# Patient Record
Sex: FEMALE | Race: WHITE | HISPANIC OR LATINO | Employment: UNEMPLOYED | URBAN - METROPOLITAN AREA
[De-identification: names, ages, dates, MRNs, and addresses within clinical notes are randomized per-mention and may not be internally consistent; named-entity substitution may affect disease eponyms.]

---

## 2017-02-17 ENCOUNTER — GENERIC CONVERSION - ENCOUNTER (OUTPATIENT)
Dept: OTHER | Facility: OTHER | Age: 37
End: 2017-02-17

## 2017-06-08 ENCOUNTER — GENERIC CONVERSION - ENCOUNTER (OUTPATIENT)
Dept: OTHER | Facility: OTHER | Age: 37
End: 2017-06-08

## 2017-10-06 ENCOUNTER — GENERIC CONVERSION - ENCOUNTER (OUTPATIENT)
Dept: OTHER | Facility: OTHER | Age: 37
End: 2017-10-06

## 2017-12-04 ENCOUNTER — GENERIC CONVERSION - ENCOUNTER (OUTPATIENT)
Dept: INTERNAL MEDICINE CLINIC | Facility: CLINIC | Age: 37
End: 2017-12-04

## 2018-01-10 NOTE — MISCELLANEOUS
Provider Comments  Provider Comments:   Pt was n/s  Try to call phone on file but voice mailbox is not set up  Signatures   Electronically signed by : JOSSELYN Jackson;  Dec  5 2016  5:56PM EST                       (Author)

## 2018-01-10 NOTE — PSYCH
Assessment    1  Major depression, recurrent (296 30) (F33 9)   2  Generalized anxiety disorder (300 02) (F41 1)   3  Family history of Depression, major, single episode, moderate : Mother    Plan    1  Amitriptyline HCl - 25 MG Oral Tablet; TAKE 1 TABLET AT BEDTIME    Chief Complaint  Referred by PCP and Rheumatologist      History of Present Illness  31  female with past history of depression  She stated she had been treated a long time ago with SSRI's and had stopped treatment because every trial came with side effects that she was not willing to endure  She stated last time she took antidepressants was over 6 years ago  No hx of Inpatient psychiatric treatment  She stated that she is having difficulties coping with chronic pain caused by ankylosing spondylitis that was dx 3 years ago  She stated she started tx for chronic pain about 1 and 1/2 years ago  She stated she has difficulties sleeping, and feels depressed due to her multiple physical conditions  3 years ago she was working as an  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[place] [address] and she cared for her 3 children  She was working about 60 hrs per week and was also doing her masters in psychology  She stated back then she lived in [place] [address]  She started having symptoms prior to that but she had ignored it for a while until one day she could not get out of car after a 2 hour drive  She stated that she was evaluated by neurosurgeon and friend of her family and was found to have ankylosing spondylitis  She stated she had multiple tx approaches including nerve pain  the last choice she was offered was oral methotrexate but she was too fearful of the side effects  She stated that her  had worked in [place] at a company out in [employer] and they made decision to move to [place] March 2015  Since she has been in [place] she has been in Humira at that has only help with her ulcerative colitis symptoms   She has been taking pain killers for the past 3 years and she stated that initially she was able to obtain relieve with just 37 5 mg of Tramadol and at this point she takes 100 mg tid  She stated her rheumatologist ran out of options and is suggesting pain management with the possibility of using fentanyl patch for tx  She has tried Elavil 5 mg and prior to that she had a trial of Cymbalta 30 mg but experienced confusion She stated that she also tried Lyrica 50 mg and she experienced increased anxiety  She is not sure if this confusion was related to a drug drug interaction with Tramadol  She stated she currently feels, irritable, fatigued, has poor sleep, feels frustrated, feels she is worse physically than she was 6 months ago  She stated she has never used prescribed sleep medications  She has 15 yo daughter, 10 yo son and 3 yo son  She stated her oldest daughter helps with the children  She stated that all her family is in [address] [facility] and she has no support here in [place]  She stated she has poor tolerance for stress, she avoids crowds  Review of Systems  anxiety, depression, emotional lability, hostility, interpersonal relationship problems, emotional problems/concerns, sleep disturbances and decreased functioning ability  Constitutional: feeling poorly and feeling tired  ENT: no ear ache, no loss of hearing, no nosebleeds or nasal discharge, no sore throat or hoarseness  Cardiovascular: no complaints of slow or fast heart rate, no chest pain, no palpitations, no leg claudication or lower extremity edema  Respiratory: no complaints of shortness of breath, no wheezing, no dyspnea on exertion, no orthopnea or PND  Gastrointestinal: nausea and diarrhea  Genitourinary: no complaints of dysuria, no incontinence, no pelvic pain, no dysmenorrhea, no vaginal discharge or abnormal vaginal bleeding  Musculoskeletal: arthralgias, joint swelling and joint stiffness     Integumentary: no complaints of skin rash or lesion, no itching or dry skin, no skin wounds  Neurological: confusion  ROS reviewed  Past Psychiatric History    Past Psychiatric History: Treated for depression many years ago  No history of psychiatric hospitalizations     Substance Abuse Hx    Substance Abuse History: denies  Active Problems    1  Abnormal uterine bleeding (626 9) (N93 9)   2  Ankylosing spondylitis (720 0) (M45 9)   3  Asthma, mild intermittent, well-controlled (493 90) (J45 20)   4  Chronic low back pain (724 2,338 29) (M54 5,G89 29)   5  Depression (311) (F32 9)   6  External hemorrhoid (455 3) (K64 4)   7  Female pelvic pain (625 9) (R10 2)   8  Generalized anxiety disorder (300 02) (F41 1)   9  History of colon polyps (V12 72) (Z86 010)   10  Hypoglycemia (251 2) (E16 2)   11  Indigestion (536 8) (K30)   12  Insomnia (780 52) (G47 00)   13  Irritable bowel syndrome (564 1) (K58 9)   14  Major depression, recurrent (296 30) (F33 9)   15  Pelvic congestion syndrome (625 5) (N94 89)   16  Screening for cervical cancer (V76 2) (Z12 4)   17  Vitamin D deficiency (268 9) (E55 9)    Past Medical History    1  History of Abdominal pain, RUQ (789 01) (R10 11)   2  History of Acute abdominal pain (789 00,338 19) (R10 9)   3  History of Burning with urination (788 1) (R30 0)   4  History of Hepatomegaly (789 1) (R16 0)   5  History of heartburn (V12 79) (Z87 898)   6  History of urinary frequency (V13 09) (Z87 898)   7  History of urinary tract infection (V13 02) (Z87 440)   8  History of Irritable bowel syndrome with diarrhea (564 1) (K58 0)   9  History of Need for influenza vaccination (V04 81) (Z23)   10  History of Right wrist pain (719 43) (M25 531)   11  Urinary tract infection (599 0) (N39 0)   12  History of Urine troubles (V47 4) (R39 89)   13  History of Wrist injury (959 3) (S69 90XA)    The active problems and past medical history were reviewed and updated today  Surgical History    The surgical history was reviewed and updated today  Allergies    1  Aspirin EC Extra Strength TBEC   2  Penicillins    Current Meds   1  Advair Diskus 250-50 MCG/DOSE Inhalation Aerosol Powder Breath Activated; INHALE 1   PUFF TWICE DAILY  RINSE MOUTH AFTER USE; Therapy: 03EPL9456 to (Kimberly Fenton)  Requested for: 08VNV9766; Last   Rx:31Oct2016 Ordered   2  Biotin Maximum Strength 63446 MCG Oral Tablet; TAKE 1 TABLET DAILY; Therapy: (Recorded:01Nov2016) to Recorded   3  Dicyclomine HCl - 10 MG Oral Capsule; TAKE 1 CAPSULE Every 8 hours PRN   CRAMPING ABDOMEN;   Therapy: 22Mar2016 to (Evaluate:23Wjl3723)  Requested for: 22Mar2016; Last   Rx:22Mar2016 Ordered   4  Dicyclomine HCl - 20 MG Oral Tablet; TAKE 1 TABLET 3 times daily PRN; Therapy: 14Oao1964 to (Evaluate:74Nto2384)  Requested for: 20Jul2016; Last   Rx:02Qti3233 Ordered   5  Flexeril 5 MG TABS; TAKE 2 TABLETS AT BEDTIME for muscle spasm; Therapy: (Recorded:01Nov2016) to Recorded   6  FreeStyle Lite Test In [employer]; Check 3 times per day; Therapy: 91DHB4334 to (Evaluate:30Jan2017); Last Rx:01Nov2016 Ordered   7  Humira 40 MG/0 8ML Subcutaneous Prefilled Syringe Kit; 1 every 7 days; Therapy: (Recorded:01Nov2016) to Recorded   8  Norethindrone 0 35 MG Oral Tablet; TAKE 1 TABLET DAILY; Therapy: 66Ygi8920 to (Evaluate:89Rwu8243)  Requested for: 70Mpe8358; Last   Rx:53Vzd9497 Ordered   9  Oxycodone-Acetaminophen 5-325 MG Oral Tablet; TAKE 1 TABLET Every twelve hours   PRN pain; Therapy: 48OUM6770 to (Evaluate:38Wxj5712); Last Rx:42Oqt0934 Ordered   10  Pantoprazole Sodium 40 MG Oral Tablet Delayed Release; TAKE 1 TABLET ONCE DAILY; Therapy: 73IBZ6187 to (Manuel Monzon)  Requested for: 31Oct2016; Last    Rx:31Oct2016 Ordered   11  ProAir  (90 Base) MCG/ACT Inhalation Aerosol Solution; INHALE 2 PUFFS    EVERY 4-6 HOURS AS NEEDED; Therapy: 10SYK4241 to (Evaluate:13Tay7557)  Requested for: 31Oct2016; Last    Rx:31Oct2016 Ordered   12   TraMADol HCl - 50 MG Oral Tablet; Therapy: (Recorded:02Tss1427) to Recorded   13  Vitamin D CAPS; take 1 tablet by mouth once daily; Therapy: (Recorded:24Mkx7545) to Recorded    The medication list was reviewed and updated today  Family Psych History  Mother    1  Family history of Ankylosing spondylitis of cervicothoracic region   2  Denied: Family history of Crohn's disease without complication, unspecified   gastrointestinal tract location   3  Family history of Depression, major, single episode, moderate   4  Family history of arthritis (V17 7) (Z82 61)   5  Family history of cardiac disorder (V17 49) (Z82 49)   6  Family history of essential hypertension (V17 49) (Z82 49)   7  Family history of hypertension (V17 49) (Z82 49)   8  Denied: Family history of liver disease   9  Denied: Family history of Malignant neoplasm of colon, unspecified part of colon  Father    8  Denied: Family history of Crohn's disease without complication, unspecified    gastrointestinal tract location   6  Family history of hypertension (V17 49) (Z82 49)   12  Denied: Family history of liver disease   15  Denied: Family history of Malignant neoplasm of colon, unspecified part of colon  Grandparent    14  Family history of cardiac disorder (V17 49) (Z82 49)  Grandmother    15  Family history of diabetes mellitus (V18 0) (Z83 3)  Grandfather    12  Family history of liver disease (V18 59) (Z83 79)  Maternal Grandfather    16  Family history of Acute leukemia   18  Family history of Throat cancer  Maternal Aunt    19  Family history of Acute leukemia   20  Family history of Brain tumor   21  Family history of Colon cancer   25  Family history of Malignant neoplasm of hepatic flexure   23  Family history of Malignant neoplasm of sigmoid colon   24  Family history of Throat cancer  Uncle    22  Family history of Malignant neoplasm of colon, unspecified part of colon  Maternal Uncle    26  Family history of Acute leukemia   27  Family history of Brain tumor   28  Family history of Colon cancer   34  Family history of Drug addiction   30  Family history of Malignant neoplasm of hepatic flexure   31  Family history of Throat cancer  Cousin    28  Family history of Acute leukemia  Family History    35  Family history of malignant neoplasm (V16 9) (Z80 9)  Mother had several suicides attempts by OD on alprazolam and alcohol  The family history was reviewed and updated today  Social History    · Caffeine use (V49 89) (F15 90)   · Cigarette smoker (305 1) (F17 210)   · Current every day smoker   · Former consumption of alcohol (V11 3) (Z87 898)   · Housewife or homemaker   · No alcohol use   · No drug use  The social history was reviewed and updated today  Vitals  Signs   Recorded: 22IMZ5607 02:53PM   Height: 4 ft 11 in  Weight: 104 lb   BMI Calculated: 21 01  BSA Calculated: 1 4    Physical Exam    Appearance: was calm and cooperative, adequate hygiene and grooming and good eye contact  Observed mood: depressed and anxious  Observed mood: affect was constricted and affect was tearful  Speech: a normal rate and fluent  Thought processes: coherent/organized  Hallucinations: no hallucinations present  Thought Content: no delusions  Abnormal Thoughts: The patient has no suicidal thoughts and no homicidal thoughts  Orientation: The patient is oriented to person, place and time, oriented to person, oriented to place and oriented to time  Recent and Remote Memory: short term memory intact and long term memory intact  Attention Span And Concentration: concentration intact  Insight: Limited insight  Judgment: Her judgment was limited  The patient is experiencing moderate to severe pain  On a scale of 0 - 10 the pain severity is an 8  Initial Evaluation provided today  Risks, Benefits And Possible Side Effects Of Medications: Risks, benefits, and possible side effects of medications explained to patient and patient verbalizes understanding  End of Encounter Meds    1  Dicyclomine HCl - 20 MG Oral Tablet; TAKE 1 TABLET 3 times daily PRN; Therapy: 42Cnv5276 to (Evaluate:10Opl2218)  Requested for: 02Hzl2587; Last   Rx:01Okt1467 Ordered    2  Norethindrone 0 35 MG Oral Tablet; TAKE 1 TABLET DAILY; Therapy: 98Lkz3152 to (Evaluate:76Jsv8134)  Requested for: 29Gvf0428; Last   Rx:49Ror1440 Ordered    3  Oxycodone-Acetaminophen 5-325 MG Oral Tablet; TAKE 1 TABLET Every twelve hours   PRN pain; Therapy: 61FDQ5230 to (Evaluate:43Byx5548); Last Rx:12Bqm9140 Ordered    4  Advair Diskus 250-50 MCG/DOSE Inhalation Aerosol Powder Breath Activated; INHALE 1   PUFF TWICE DAILY  RINSE MOUTH AFTER USE; Therapy: 37LFM5337 to (Jordana Mays)  Requested for: 31Oct2016; Last   Rx:60Nod5376 Ordered   5  ProAir  (90 Base) MCG/ACT Inhalation Aerosol Solution; INHALE 2 PUFFS   EVERY 4-6 HOURS AS NEEDED; Therapy: 25UWU8529 to (Evaluate:09Fxo1835)  Requested for: 31Oct2016; Last   Rx:31Oct2016 Ordered    6  Amitriptyline HCl - 25 MG Oral Tablet; TAKE 1 TABLET AT BEDTIME; Therapy: 48XJN1532 to (Evaluate:09Jan2017); Last Rx:10Nov2016 Ordered    7  FreeStyle Lite Test In [employer]; Check 3 times per day; Therapy: 02ZBE1626 to (Evaluate:30Jan2017); Last Rx:01Nov2016 Ordered    8  Pantoprazole Sodium 40 MG Oral Tablet Delayed Release; TAKE 1 TABLET ONCE DAILY; Therapy: 96DGF7353 to (Evaluate:29Apr2017)  Requested for: 31Oct2016; Last   Rx:31Oct2016 Ordered    9  Dicyclomine HCl - 10 MG Oral Capsule; TAKE 1 CAPSULE Every 8 hours PRN   CRAMPING ABDOMEN;   Therapy: 22Mar2016 to (Evaluate:06Lhs8897)  Requested for: 22Mar2016; Last   Rx:22Mar2016 Ordered    10  Biotin Maximum Strength 75002 MCG Oral Tablet; TAKE 1 TABLET DAILY; Therapy: (Recorded:01Nov2016) to Recorded   11  Flexeril 5 MG TABS (Cyclobenzaprine HCl); TAKE 2 TABLETS AT BEDTIME for muscle    spasm; Therapy: (Recorded:01Nov2016) to Recorded   12   Humira 40 MG/0 8ML Subcutaneous Prefilled Syringe Kit; 1 every 7 days; Therapy: (Recorded:01Nov2016) to Recorded   13  TraMADol HCl - 50 MG Oral Tablet; Therapy: (Recorded:14Apr2015) to Recorded   14  Vitamin D CAPS; take 1 tablet by mouth once daily; Therapy: (Recorded:01Nov2016) to Recorded    Future Appointments    Date/Time Provider Specialty Site   12/14/2016 11:30 AM PATRICIA Goff  Obstetrics/Gynecology [facility]   12/20/2016 02:50 PM PATRICIA Reardon   Endocrinology [facility]   12/05/2016 10:00 AM Brittany Downs  Internal [facility]   12/22/2016 01:30 PM Brittany Downs  Internal [facility]   12/08/2016 09:00 AM Samia Leyva [facility] [address]     Signatures   Electronically signed by : PATRICIA Jameson ; Nov 10 2016  3:04PM EST                       (Author)

## 2018-01-10 NOTE — RESULT NOTES
Message   cortisol levels remain low  unclear why? Pls arrange for 1 mcg low dose cosyntropin stim test at [facility]  Also get MRI of pituitary gland  call for new symptoms or problems     Verified Results  (1) DHEA-S 21Nov2016 07:58AM Selinda Peppers   TW Order Number: [id number]     Test Name Result Flag Reference   DHEA-SULFATE 48 2 ug/dL L 84 8 - 378 0   Performed at:  [facility] [address]  [id number]  : Lucio Wagoner MD, Phone:  [phone]     (1) ADRENOCORTICOTROPHIN 04YEL1147 07:58AM [employer] Order Number: [id number]     Test Name Result Flag Reference   ADRENOCORT  TROP 9 8 pg/mL  7 2 - 63 3   ACTH reference interval for samples collected between 7 and 10 AM     Performed at:  [facility] [address]  [id number]  : Lucio Wagoner MD, Phone:  [phone]     (1) CORTISOL AM SPECIMEN 92HMN9136 07:58AM Selinda Peppers   TW Order Number: [id number]     Test Name Result Flag Reference   CORTISO AM SPEC 5 5 ug/mL  4 2-22 4   Reference ranges established for specimens drawn between 7 and 9 am  Results may be inaccurate if timing is not correct  (1) INSULIN ANTIBODIES 09KHF8534 10:52AM [employer] Order Number: [id number]     Test Name Result Flag Reference   INSULIN ANTIBODIES <5 0 uU/mL     This test is also known as insulin autoantibody or IAA    Reference Range:  <5 0        Negative  > or = 5 0  Positive    Performed at:  [address] [facility] Endocrinology  [address] [place], [place]  [de-identified]  : Aspen Beck MD, Phone:  [phone]     (1) PROINSULIN 64UQP5935 10:52AM Selinda Peppers   TW Order Number: [id number]     Test Name Result Flag Reference   PROINS 1 9 pmol/L  0 0 - 10 0   Performed at:  [address]  [id number]  : Neo Brown MD, Phone:  [phone] Plan  Low serum cortisol level    · * MRI BRAIN PITUITARY WO AND W CONTRAST; Status:Need Information - Financial  Authorization;  Requested for:23Nov2016;

## 2018-01-11 NOTE — RESULT NOTES
Verified Results  * US PELVIS COMPLETE [facility] [place] AND TRANSVAGINAL) 80FJM5414 10:54AM Leetta Alpers Order Number: [id number]     Test Name Result Flag Reference   US PELVIS COMPLETE (TRANSABDOMINAL AND TRANSVAGINAL) (Report)     PELVIC ULTRASOUND, COMPLETE     INDICATION: Pelvic pain, irregular cycles and frequent urination  LMP was 2016, still spotting  COMPARISON: CT abdomen and pelvis 2016     TECHNIQUE:  Transabdominal pelvic ultrasound was performed in sagittal and transverse planes with a curvilinear transducer  Additional transvaginal imaging was performed to better evaluate the endometrium and ovaries  Imaging included volumetric    sweeps as well as traditional still imaging technique  FINDINGS:     UTERUS:   The uterus is anteverted in position, measuring 10 0 x 4 7 x 5 6 cm  Contour and echotexture appear normal   scar  The cervix shows no suspicious abnormality  ENDOMETRIUM:    Normal caliber of 4 mm  Homogenous and normal in appearance  OVARIES/ADNEXA:   Right ovary: 2 7 x 1 8 x 1 6 cm  No suspicious right ovarian abnormality  1 6 cm exophytic follicle versus paraovarian cyst    Doppler flow within normal limits  Left ovary: 3 6 x 2 6 x 3 0 cm  No suspicious left ovarian abnormality  2 8 x 2 0 x 2 5 cm simple cyst    Doppler flow within normal limits  No suspicious adnexal mass or loculated collections  There is no free fluid  Incidental note is made of negligible post void urinary bladder residual        IMPRESSION:      Essentially unremarkable pelvic ultrasound  Simple appearing ovarian follicles and/or cysts  Workstation performed: [id number]     Signed by:   Pili Dawkins DO   16       Discussion/Summary   SIMPLE CYST AND FOLLICLES    F/U WITH GYN FOR DISCOMFORT

## 2018-01-11 NOTE — RESULT NOTES
Message   Cortisol levels are better  She seems to have good response to the test  ? she is scheduled with MRI of the pituitary? Verified Results  (1) CORTISOL RANDOM 59EOH4630 01:08PM Christian[facility] Minion     Test Name Result Flag Reference   CORTISOL, RANDOM 13 3 ug/mL       (1) DHEA-S 56DYE4002 01:08PM [place] [facility]     Test Name Result Flag Reference   DHEA-SULFATE 91 9 ug/dL  84 8 - 378 0   Performed at:  [address] [place], [place] [address] [address]  [id number]  : Digna Lopez MD, Phone:  [phone]     (1) CORTISOL RANDOM 52ECQ6780 12:38PM Christiano Minion     Test Name Result Flag Reference   CORTISOL, RANDOM 15 9 ug/mL       (1) CORTISOL RANDOM 18VYJ8838 11:29AM [place] [facility]     Test Name Result Flag Reference   CORTISOL, RANDOM 8 8 ug/mL       (1) DHEA-S 22RMU4875 11:29AM [place] [facility]     Test Name Result Flag Reference   DHEA-SULFATE 89 7 ug/dL  84 8 - 378 0   Performed at:  [employer] [address]  [id number]  : Digna Lopez MD, Phone:  [phone]     [phone] 50RPM6981 11:29AM [place] [facility]ion     Test Name Result Flag Reference   ADRENOCORT  TROP 5 9 pg/mL L 7 2 - 63 3   ACTH reference interval for samples collected between 7 and 10 AM   Performed at:  [employer] [address]  [id number]  : Digna Lopez MD, Phone:  [phone]

## 2018-01-11 NOTE — RESULT NOTES
Message   Insulin parameters for the current glucose of 74 are acceptable  Start vitamin D 5000 units once a day  Cortisol could be temporary low because she recently started Advair inhaler  Ask her if she had any other steroid intake also? repeat cortisol, DHEAS and ACTH about 1- 2 weeks after stopping the last steroid use  Verified Results  (1) VITAMIN D 25-HYDROXY [id number] 10:52AM Kermit Matthew Order Number: [id number]     Test Name Result Flag Reference   VIT D 25-HYDROX 17 2 ng/mL L 30 0-100 0   This assay is a certified procedure of the CDC Vitamin D Standardization Certification Program (VDSCP)     Deficiency <20ng/ml   Insufficiency 20-30ng/ml   Sufficient  ng/ml     *Patients undergoing fluorescein dye angiography may retain small amounts of fluorescein in the body for 48-72 hours post procedure  Samples containing fluorescein can produce falsely elevated Vitamin D values  If the patient had this procedure, a specimen should be resubmitted post fluorescein clearance  (1) PTH N-TERMINAL (INTACT) [id number] 10:52AM Kermit Castro Order Number: [id number]     Test Name Result Flag Reference   PARATHYROID HORMONE INTACT 61 0 pg/mL  14 0-72 0     (1) RENAL FUNCTION PANEL [id number] 10:52AM Kermit Castro Order Number: [id number]     Test Name Result Flag Reference   PHOSPHORUS 2 8 mg/dL  2 7-4 5   - Patient Instructions: This is a non fasting blood test  Please drink two glasses of water the morning of test - Patient Instructions: This bloodwork is non-fasting  Please drink two glasses of water morning of bloodwork  (1) C-PEPTIDE [id number] 10:52AM Kermit Castro Order Number: [id number]     Test Name Result Flag Reference   C PEPTIDE 1 7 ng/mL  1 1 - 4 4   C-Peptide reference interval is for fasting patients      Performed at:  [address] [place] [address]  [id number]  : Sharee Vásquez MD, Phone: [phone]     (1) INSULIN [id number] 10:52AM Ginny Joint Order Number: [id number]     Test Name Result Flag Reference   INSULIN 5 6 mU/L  3 0-25 0     (1) T4, FREE [id number] 10:52AM Ginny Joint Order Number: [id number]     Test Name Result Flag Reference   T4,FREE 1 05 ng/dL  0 76-1 46   - Patient Instructions: This is a non fasting blood test  Please drink two glasses of water the morning of test - Patient Instructions: This bloodwork is non-fasting  Please drink two glasses of water morning of bloodwork  (1) TSH [id number] 10:52AM [facility] Number: [id number]     Test Name Result Flag Reference   TSH 1 130 uIU/mL  0 358-3 740   - Patient Instructions: This bloodwork is non-fasting  Please drink two glasses of water morning of bloodwork  - Patient Instructions: This is a non fasting blood test  Please drink two glasses of water the morning of test - Patient Instructions: This bloodwork is non-fasting  Please drink two glasses of water morning of bloodwork  Patients undergoing fluorescein dye angiography may retain small amounts of fluorescein in the body for 48-72 hours post procedure  Samples containing fluorescein can produce falsely depressed TSH values  If the patient had this procedure,a specimen should be resubmitted post fluorescein clearance  The recommended reference ranges for TSH during pregnancy are as follows:  First trimester 0 1 to 2 5 uIU/mL  Second trimester  0 2 to 3 0 uIU/mL  Third trimester 0 3 to 3 0 uIU/m     (1) ADRENOCORTICOTROPHIN [id number] 10:52AM [facility] [facility] Order Number: [id number]     Test Name Result Flag Reference   ADRENOCORT  TROP 15 7 pg/mL  7 2 - 63 3   ACTH reference interval for samples collected between 7 and 10 AM     Performed at:  [facility] [address]  [id number]  : Eulalio Last MD, Phone:  [phone]     (1) CORTISOL AM SPECIMEN [id number] 10: Laurence Mujica Order Number: [id number]     Test Name Result Flag Reference   CORTISO AM SPEC 4 0 ug/mL L 4 2-22 4   Reference ranges established for specimens drawn between 7 and 9 am  Results may be inaccurate if timing is not correct  (1) ESTRADIOL [id number] 10:52AM Ryan Talley Order Number: [id number]     Test Name Result Flag Reference   ESTRADIOL 289 0 pg/mL     ESTRADIOL:    Mentruating Females: Follicular phase:  88 9-770 0  pg/mL      Mid-cycle phase:   49 9- 367 2 pg/mL      Luteal phase:      40 2-259    pg/mL     Postmenopausal females (untreated):  <11-58 3  pg/mL  On Menopausal Hormone Therapy (MHT): < 1 pg/mL    Women taking the drug Fulvestrant (Faslodex) may have falsely elevated Estradiol results  Suggest ordering [facility] Estradiol, LC/MS -test number [id number], to monitor these patients  - Patient Instructions: This is a non fasting blood test  Please drink two glasses of water the morning of test - Patient Instructions: This bloodwork is non-fasting  Please drink two glasses of water morning of bloodwork  (1) DHEA-S [id number] 10:52AM Ryan Talley Order Number: [id number]     Test Name Result Flag Reference   DHEA-SULFATE 40 8 ug/dL L 84 8 - 378 0   Performed at:  [address]  [id number]  : Silvestre Streeter MD, Phone:  [phone]     (1) LH ([address]) [id number] 10:52AM Ryan Talley Order Number: [id number]     Test Name Result Flag Reference   LUTEINIZING HORMONE 4 3 mIU/mL     LH:   Menstruating Females:   ? Follicular Phase ? 1 9-12 8 ? mIU/mL   ? Mid-Cycle Phase ? 22 8-76 1 mIU/mL   ? Luteal Phase ? ? ?0 6-13 5 ? mIU/mL   Postmenopausal Females:   ? On Menopausal Hormone Therapy(MHT) 1 1-52 4 mIU/mL   ? Untreated ? ? ? ? ? ? ? ? ? ? ? ? ?8 6-61 8 mIU/mL    - Patient Instructions:  This is a non fasting blood test  Please drink two glasses of water the morning of test - Patient Instructions: This bloodwork is non-fasting  Please drink two glasses of water morning of bloodwork  (1) [facility] [id number] 10:52AM [place] Order Number: [id number]     Test Name Result Flag Reference   FOLLICLE STIMULATING HORMONE 6 9 mIU/mL     - Patient Instructions: This is a non fasting blood test  Please drink two glasses of water the morning of test - Patient Instructions: This bloodwork is non-fasting  Please drink two glasses of water morning of bloodwork  FSH:  Menstruating Females: Follicular Phase  7 0-73 4 mIU/mL    Mid-Cycle Phase   5 2-17 5 mIU/mL    Luteal Phase      1 7-9 5  mIU/mL  Postmenopausal Females    On Menopausal Hormone Therapy(HRT) 5 9-72 8   mIU/mL    Untreated                          12 7-132 2 mIU/mL     (1) PROLACTIN [id number] 10:52AM Shelbie Sinrichard SEQUEIRA Order Number: [id number]     Test Name Result Flag Reference   PROLACTIN 10 4 ng/mL     PROLACTIN:     Females:   ? Non-pregnant ? ?2 2-30 3 ?ng/mL   ? Pregnant ? ? ? ?8 1-347 6 ng/mL   ? Post-menopausal 0 7-31 5 ?ng/mL     - Patient Instructions: This is a non fasting blood test  Please drink two glasses of water the morning of test - Patient Instructions: This bloodwork is non-fasting  Please drink two glasses of water morning of bloodwork  (1) HEPATIC FUNCTION PANEL [id number] 10:52AM Jackie Neumann Order Number: [id number]     Test Name Result Flag Reference   BILI, DIRECT 0 26 mg/dL H 0 00-0 20   - Patient Instructions: This is a non fasting blood test  Please drink two glasses of water the morning of test     - Patient Instructions: This is a non fasting blood test  Please drink two glasses of water the morning of test - Patient Instructions: This bloodwork is non-fasting  Please drink two glasses of water morning of bloodwork

## 2018-01-12 NOTE — RESULT NOTES
Verified Results  * US ABDOMEN COMPLETE [id number] 11:12AM Angelo Palma Order Number: [id number]     Test Name Result Flag Reference   US ABDOMEN COMPLETE (Report)     ABDOMEN ULTRASOUND, COMPLETE     INDICATION: Upper abdominal pain  Status post cholecystectomy  COMPARISON: None  TECHNIQUE:  Real-time ultrasound of the abdomen was performed with a curvilinear transducer with both volumetric sweeps and still imaging techniques  FINDINGS:     PANCREAS: Visualized portions of the pancreas are within normal limits  AORTA AND IVC: Visualized portions are normal for patient age  LIVER:   Size: Within normal range  The liver measures 14 9 cm in the midclavicular line  Contour: Surface contour is smooth  Parenchyma: Echogenicity and echotexture are within normal limits  No evidence of suspicious mass  The main portal vein is patent and hepatopetal       BILIARY:   The gallbladder is normal in caliber  No wall thickening or pericholecystic fluid  No stones or sludge identified  Sonographic Emerald Guzman sign is negative  No intrahepatic biliary dilatation  CBD measures 3 mm  No choledocholithiasis  KIDNEY:    Right kidney measures 9 3 x 4 2 cm  Within normal limits  Left kidney measures 10 1 x 5 0 cm  Within normal limits  SPLEEN:    Measures 9 2 cm  Within normal limits  ASCITES: None  IMPRESSION:     Normal        Workstation performed: [id number]     Signed by:    Tip Chiu MD   8/18/16       Discussion/Summary   ULTRASOUND IS NORMAL

## 2018-01-12 NOTE — PSYCH
Treatment Plan Tracking    #1 Treatment Plan not completed within required time limits due to: Other: There was no time during the initial assessment in order to complete the recovery plan             Signatures   Electronically signed by : SIVA Ledbetter; Oct  6 2016  2:39PM EST                       (Author)

## 2018-01-12 NOTE — PSYCH
History of Present Illness    Pre-morbid level of function and History of Present Illness:  I was healthy up until four years ago  Then I was diagnosed with my medical condition and I now have depression and anxiety  Reason for evaluation and partial hospitalization as an alternative to inpatient hospitalization: N/A  Previous Psychiatric/psychological treatment/year: In my early 21's I had an alcohol problem and my parents did an intervention  I had therapy for about a year on and off  I was drinking to deal with depression  Current Psychiatrist/Therapist: Scheduled with one of the psychiatrists here and the undersigned  Outpatient and/or Partial and Other [employer] Used (CTT, ICM, VNA): Inpatient: 1 week in [facility] so she did not detox  and Outpatient: In her 21's  Problem Assessment:   SOCIAL/VOCATION:   Family Constellation (include parents, relationship with each and pertinent Psych/Medical History): Mother: Jaz-Kanchan   Father: [id number]   Siblin/2 brother same mom   Children: 5,11 and a 1year old   Other: in the middle of the divorce   The patient relates best to private person  She lives with with her  for now, her children and their is a girlfriend living there    She does not live alone  Domestic Violence: No past history of domestic violence  The patient is not currently experiencing domestic violence  There is not suspected domestic violence  There is no history of child abuse  There is no history of sexual abuse  Additional Comments related to family/relationships/peer support: family is supportive  School or Work History (strengths/limitations/needs: good mom, organized, hard worker  Her highest grade level achieved was  Is working on a masters in business and psychology  Financial status includes ok     LEISURE ASSESSMENT (Include past and present hobbies/interests and level of involvement (Ex: Group/Club Affiliations): flower arrangements and decorate my house  Her primary language is  [place]  Preferred language is english or Georgian I don't really care    Ethnic considerations are [place]  Religions affiliations and level of involvement - Prefers not to list it  Spirituality and edel have helped her cope with difficult situations in her life  FUNCTIONAL STATUS: There has been a recent change in the patient's ability to do the following:  She does not need [employer]  Level of Assistance Needed/By Whom?: still does everything but with her flareups she has increasing limitations  The patient learns best by  reading and hands on  SUBSTANCE ABUSE ASSESSMENT: past substance abuse, but no current substance abuse  Substance/Route/Age/Amount/Frequency/Last Use: drank in her 20's, use to drink socially,now none at all due to all of her medications  Previous detox/rehab treatment  But never detoxed, this was in her 21's she was put on medications so she would not detox  HEALTH ASSESSMENT: She has lost 10 lbs or more in the last 6 months without trying  She has had decreased appetite for 5 days or more  no nausea  no vomiting  diarrhea  no referral to PCP needed  no referral to nutritionist needed  not referred to PCP  Current PCP: [facility]  PCP not notified  due to health issues  she does eat just smaller portions  LEGAL: No Mental Health Advance Directive or Power of  on file  She does not want an information packet about Mental Health Advance Directives  The following ratings are based on my interview(s) with with Jaz  Risk of Harm to Self:   Demographic risk factors include major health issues and going thru a divorce  Historical Risk Factors include: history of suicidal behaviors/attempts, self-mutilating behaviors and history of impulsive behaviors  At age 14 she took an overdose of pain medication she also use to cut her forearms as a teenager       Recent Specific Risk Factors include: sense of hopelessness/helplessness, unable to visualize a realistic positive future, chronic pain or health problems, significant legal issues pending and diagnosis of depression  These risk factors presented within the last shared she would not hurt herself  Risk of Harm to Others:   Demographic Risk Factors include: no thoughts of hurting others  Recent Specific Risk Factors include: concomitant mood or thought disorder, multiple stressors and going thru a divorce but she filed  The following interventions are recommended: consultation with with practice psychiatrist     Notes regarding this Risk Assessment: Has no desire to hurt herself or any other person  Review of Systems  anxiety, depression, emotional lability, interpersonal relationship problems, emotional problems/concerns and sleep disturbances, but no euphoria and no hostility  Additional findings include Has both difficulty in falling asleep and staying asleep  Constitutional: feeling poorly, feeling tired and recent 20 pounds in the last 1 and 1/2 years  lb weight loss  Eyes: No complaints of eye pain, no red eyes, no eyesight problems, no discharge, no dry eyes, no itching of eyes  ENT: no complaints of earache, no loss of hearing, no nose bleeds, no nasal discharge, no sore throat, no hoarseness  Cardiovascular: No complaints of slow heart rate, no fast heart rate, no chest pain, no palpitations, no leg claudication, no lower extremity edema  Respiratory: asthma  Gastrointestinal: ulcerative colitis  Genitourinary: No complaints of dysuria, no incontinence, no pelvic pain, no dysmenorrhea, no vaginal discharge or bleeding  Musculoskeletal: Ankylosing Spondylitis  Integumentary: No complaints of skin rash or lesions, no itching, no skin wounds, no breast pain or lump  Neurological: headache, numbness, tingling, dizziness, limb weakness and difficulty walking     Psychiatric: anxiety, personality change, sleep disturbances, depression and emotional problems, but not suicidal    Endocrine: No complaints of proptosis, no hot flashes, no muscle weakness, no deepening of the voice, no feelings of weakness  Hematologic/Lymphatic: No complaints of swollen glands, no swollen glands in the neck, does not bleed easily, does not bruise easily  ROS reviewed  Active Problems    1  Abnormal uterine bleeding (626 9) (N93 9)   2  Ankylosing spondylitis (720 0) (M45 9)   3  Chronic low back pain (724 2,338 29) (M54 5,G89 29)   4  Depression (311) (F32 9)   5  External hemorrhoid (455 3) (K64 4)   6  Female pelvic pain (625 9) (R10 2)   7  History of colon polyps (V12 72) (Z86 010)   8  Hypoglycemia (251 2) (E16 2)   9  Indigestion (536 8) (K30)   10  Insomnia (780 52) (G47 00)   11  Irritable bowel syndrome (564 1) (K58 9)   12  Pelvic congestion syndrome (625 5) (N94 89)   13  Screening for cervical cancer (V76 2) (Z12 4)   14  Vitamin D deficiency (268 9) (E55 9)    Past Medical History    1  History of Abdominal pain, RUQ (789 01) (R10 11)   2  History of Acute abdominal pain (789 00,338 19) (R10 9)   3  History of Burning with urination (788 1) (R30 0)   4  History of Hepatomegaly (789 1) (R16 0)   5  History of heartburn (V12 79) (Z87 898)   6  History of urinary frequency (V13 09) (Z87 898)   7  History of urinary tract infection (V13 02) (Z87 440)   8  History of Irritable bowel syndrome with diarrhea (564 1) (K58 0)   9  History of Need for influenza vaccination (V04 81) (Z23)   10  History of Right wrist pain (719 43) (M25 531)   11  Urinary tract infection (599 0) (N39 0)   12  History of Urine troubles (V47 4) (R39 89)   13  History of Wrist injury (959 3) (S69 90XA)    Family Psych History  Mother    1  Family history of Ankylosing spondylitis of cervicothoracic region   2   Denied: Family history of Crohn's disease without complication, unspecified   gastrointestinal tract location 3  Family history of cardiac disorder (V17 49) (Z82 49)   4  Family history of essential hypertension (V17 49) (Z82 49)   5  Family history of hypertension (V17 49) (Z82 49)   6  Denied: Family history of liver disease   7  Denied: Family history of Malignant neoplasm of colon, unspecified part of colon  Father    8  Denied: Family history of Crohn's disease without complication, unspecified   gastrointestinal tract location   9  Family history of hypertension (V17 49) (Z82 49)   10  Denied: Family history of liver disease   11  Denied: Family history of Malignant neoplasm of colon, unspecified part of colon  Grandparent    12  Family history of cardiac disorder (V17 49) (Z82 49)  Grandmother    13  Family history of diabetes mellitus (V18 0) (Z83 3)  Grandfather    15  Family history of liver disease (V18 59) (Z83 79)  Maternal Grandfather    13  Family history of Acute leukemia   16  Family history of Throat cancer  Maternal Aunt    17  Family history of Acute leukemia   18  Family history of Brain tumor   23  Family history of Colon cancer   21  Family history of Malignant neoplasm of hepatic flexure   21  Family history of Malignant neoplasm of sigmoid colon   25  Family history of Throat cancer  Uncle    21  Family history of Malignant neoplasm of colon, unspecified part of colon  Maternal Uncle    24  Family history of Acute leukemia   25  Family history of Brain tumor   32  Family history of Colon cancer   32  Family history of Drug addiction   28  Family history of Malignant neoplasm of hepatic flexure   29  Family history of Throat cancer  Cousin    27  Family history of Acute leukemia  Family History    31  Family history of malignant neoplasm (V16 9) (Z80 9)    The family history was reviewed and updated today  Substance Abuse Hx    Substance Abuse History: does not drink at all            Social History    · Cigarette smoker (305 1) (F17 210)   · Current every day smoker   · Former consumption of alcohol (V11 3) (Z87 898)   · No alcohol use  The social history was reviewed and updated today  Current Meds   1  Dicyclomine HCl - 10 MG Oral Capsule; TAKE 1 CAPSULE Every 8 hours PRN   CRAMPING ABDOMEN;   Therapy: 22Mar2016 to (Evaluate:15Bhl5153)  Requested for: 22Mar2016; Last   Rx:22Mar2016 Ordered   2  Dicyclomine HCl - 20 MG Oral Tablet; TAKE 1 TABLET 3 times daily PRN; Therapy: 25Fnx2025 to (Evaluate:49Reb9360)  Requested for: 00Gtk7067; Last   Rx:92Qkl7825 Ordered   3  Flexeril 5 MG TABS (Cyclobenzaprine HCl); Therapy: (Recorded:19Nov2015) to Recorded   4  Humira 40 MG/0 8ML Subcutaneous Prefilled Syringe Kit; Therapy: (Recorded:17Nov2015) to Recorded   5  Lexapro 5 MG Oral Tablet (Escitalopram Oxalate); TAKE 1 TABLET DAILY; Therapy: 37Xjc8342 to (Evaluate:21Scs1512)  Requested for: 99Izv1120; Last   Rx:59Opv3787 Ordered   6  Norethindrone 0 35 MG Oral Tablet; TAKE 1 TABLET DAILY; Therapy: 70Qvv2631 to (Evaluate:69Ail6970)  Requested for: 44Aqx2500; Last   Rx:01Rqy5078 Ordered   7  Omeprazole 20 MG Oral Tablet Delayed Release; Take one tablet daily; Therapy: 30RMB3407 to (Evaluate:40Ehp7538)  Requested for: 36OVF8523; Last   Rx:95Yac8597 Ordered   8  Oxycodone-Acetaminophen 5-325 MG Oral Tablet; TAKE 1 TABLET Every twelve hours   PRN pain; Therapy: 74UBZ1018 to (Evaluate:32Wik9626); Last Rx:32Een7741 Ordered   9  TraMADol HCl - 50 MG Oral Tablet; Therapy: (Recorded:75Gbi5880) to Recorded    Allergies    1  Aspirin EC Extra Strength TBEC   2  Penicillins    Physical Exam    Appearance: was calm and cooperative, adequate hygiene and grooming and good eye contact  Observed mood: depressed  Observed mood: affect was constricted  Speech: decreased volume and slowed, but sparse  Thought processes: coherent/organized and normal thought processes  Hallucinations: no hallucinations present  Thought Content: no delusions  Abnormal Thoughts: The patient has no suicidal thoughts  Orientation: The patient is oriented to person, place and time, oriented to person, oriented to place and oriented to time  Recent and Remote Memory: short term memory intact and long term memory intact  Attention Span And Concentration: concentration intact  Insight: Insight intact  Judgment: Her judgment was intact  Muscle Strength And Tone  has difficulty with her walking  Language: no difficulty naming common objects, no difficulty repeating a phrase and no difficulty writing a sentence  Fund of knowledge: Patient displays adequate knowledge of current events, adequate fund of knowledge regarding past history and adequate fund of knowledge regarding vocabulary  The patient is experiencing moderate to severe pain  On a scale of 0 - 10 the pain severity is a 6  DSM    Provisional Diagnosis: major depression  anxiety  irritability due to the pain  Assessment    1  Family history of Drug addiction : Maternal Uncle   2  Insomnia (780 52) (G47 00)   3  Major depression, recurrent (296 30) (F33 9)   4  Generalized anxiety disorder (300 02) (F41 1)    Future Appointments    Date/Time Provider Specialty Site   12/14/2016 11:30 AM PATRICIA Oscar  Obstetrics/Gynecology [facility]   11/04/2016 01:10 PM PATRICIA Allen  Endocrinology [facility]   10/12/2016 09:45 AM PATRICIA Duenas  Urology [facility] acute [facility]   11/10/2016 02:00 PM PATRICIA Perez Asp   Psychiatry [facility]   10/31/2016 10:00 AM Brittany Remy [facility] General acute hospital     Signatures   Electronically signed by : MEGHANN FrySW; Oct  6 2016  2:15PM EST                       (Author)    Electronically signed by : PATRICIA Woodard ; Oct  6 2016  3:03PM EST                       (Author)

## 2018-01-13 NOTE — MISCELLANEOUS
Provider Comments  Provider Comments:   Patient was a no-show today, called patient and rescheduled appointment for 03/22/2016  Patients states PT appointment went long  Wasn't able to keep appointment        Signatures   Electronically signed by : Erasmo Seay; Mar 16 2016  6:54AM EST                       (Author)    Electronically signed by : PATRICIA Sarkar ; Mar 20 2016  1:50AM EST                       (Author)

## 2018-01-13 NOTE — RESULT NOTES
Message    Stool studies are negative      pt aware of results  CF         Verified Results  (1) STOOL CULTURE [id number] 02:35PM No Corley    Order Number: [id number]     Test Name Result Flag Reference   CLINICAL REPORT (Report)     Test:        Stool culture  Specimen Type:   Stool  Specimen Date:   7/22/2016 2:35 PM  Result Date:    7/24/2016 1:11 PM  Result Status:   Final result  Resulting Lab:   [place] [address]            Tel: [phone]      CULTURE                                       ------------------                                   No Salmonella, Shigella or Campylobacter isolated      Shiga Toxin 1 NOT detected, Shiga Toxin 2 NOT detected

## 2018-01-13 NOTE — RESULT NOTES
Verified Results  (1) HEMOGLOBIN A1C [id number] 10:56AM [employer] [facility] Order Number: [id number]  TW Order Number: [id number]     Test Name Result Flag Reference   HEMOGLOBIN A1C 4 9 %  4 2-6 3   EST  AVG  GLUCOSE 94 mg/dl       (1) LIPID PANEL FASTING W DIRECT LDL REFLEX [id number] 10:56AM [employer] Order Number: [id number]  TW Order Number: [id number] Order Number: [id number]     Test Name Result Flag Reference   CHOLESTEROL 188 mg/dL     LDL CHOLESTEROL CALCULATED 108 mg/dL H 0-100   Triglyceride:         Normal              <150 mg/dl       Borderline High    150-199 mg/dl       High               200-499 mg/dl       Very High          >499 mg/dl  Cholesterol:         Desirable        <200 mg/dl      Borderline High  200-239 mg/dl      High             >239 mg/dl  HDL Cholesterol:        High    >59 mg/dL      Low     <41 mg/dL  LDL Cholesterol:        Optimal          <100 mg/dl        Near Optimal     100-129 mg/dl        Above Optimal          Borderline High   130-159 mg/dl          High              160-189 mg/dl          Very High        >189 mg/dl  LDL CALCULATED:    This screening LDL is a calculated result  It does not have the accuracy of the Direct Measured LDL in the monitoring of patients with hyperlipidemia and/or statin therapy  Direct Measure LDL (AIT117) must be ordered separately in these patients  TRIGLYCERIDES 63 mg/dL  <=150   Specimen collection should occur prior to N-Acetylcysteine or Metamizole administration due to the potential for falsely depressed results  HDL,DIRECT 67 mg/dL H 40-60   Specimen collection should occur prior to Metamizole administration due to the potential for falsely depressed results       (1) TSH WITH FT4 REFLEX [id number] 10:56AM [employer] Order Number: [id number]  TW Order Number: [id number] Order Number: [id number]     Test Name Result Flag Reference   TSH 1 500 uIU/mL  0 358-3 740   Patients undergoing fluorescein dye angiography may retain small amounts of fluorescein in the body for 48-72 hours post procedure  Samples containing fluorescein can produce falsely depressed TSH values  If the patient had this procedure,a specimen should be resubmitted post fluorescein clearance  The recommended reference ranges for TSH during pregnancy are as follows:  First trimester 0 1 to 2 5 uIU/mL  Second trimester  0 2 to 3 0 uIU/mL  Third trimester 0 3 to 3 0 uIU/m       Discussion/Summary   CHOLESTEROL AND THYROID OK

## 2018-01-15 NOTE — PSYCH
Behavioral Health Outpatient Intake    Referred By: David Cornejo REF  Employment: The patient is not employed  Emergency Contact Information:   Emergency Contact: lisbet freed   Relationship to Patient: Katy Sebastian   Phone Number: [phone]   Previous Psychiatric Treatment: She has previously been seen by a psychiatrist  Amelie Rivas ago in pr  She has previously been seen by a therapist  Supriya Veloz History: no  service  She has not had combat service  Insurance Subscriber: LISBET FREED   : 1973   Address: [address]   Employer: [employer]   Employer Address: [employer]   Primary Insurance: BC/   ID number: [id number]   Group number: [id number]         Presenting Problem (in patient's words): DEPRESSION, ANXIETY, HLTH RELATED ISSUES  Substance Abuse: NONE  Previous Treatment: The patient has not been seen here in the past      Accepted as Patient   DS 10/13/16 & tmj 11/10/16     Primary Care Physician: DR Zafar Pelayo       Signatures   Electronically signed by : Andre Holloway, ; Aug 30 2016 10:20AM EST                       (Author)

## 2018-01-16 NOTE — MISCELLANEOUS
Provider Comments  Provider Comments:   PT NO SHOWED FOR APPT      Signatures   Electronically signed by : PATRICIA Gonzalez ; Dec 20 2016  3:47PM EST                       (Review)

## 2018-01-18 NOTE — MISCELLANEOUS
Provider Comments  Provider Comments:   Pt was a n/s  Called phone on file and voice mailbox not set up, could not LM        Signatures   Electronically signed by : Rayray Arias, [facility]; Dec 22 2016  5:09PM EST                       (Author)

## 2018-02-21 ENCOUNTER — TELEPHONE (OUTPATIENT)
Dept: GASTROENTEROLOGY | Facility: CLINIC | Age: 38
End: 2018-02-21

## 2018-02-21 NOTE — TELEPHONE ENCOUNTER
DR Carter Alva PT    Release point calling on the behave of Jayda Hale requesting pt records  Fax # was provided for record release

## 2018-03-07 NOTE — PSYCH
Message  Patient No Show Letter - Behavioral Health:     Date: 12/13/2016     Dear Mac Smart,     We missed seeing you for a scheduled appointment at [facility] on 12-12-16 at 3:20pm with Dr Lavena Rubinstein  Our goal is to offer the best possible care to our patients, so we are concerned when you are unable to keep a scheduled appointment  Please call us at [phone] so that we can reschedule the appointment for a date and time that will work for you  We understand that circumstances may arise which make it impossible for you to keep a scheduled appointment  Should this happen in the future, please call us as soon as you know the appointment will be missed  The earlier you let us know, the more likely we can offer your scheduled appointment time to another patient  We hope to hear from you soon       Sincerely,   Dr Jennifer Jason   Electronically signed by : Emmett Ace, ; Dec 13 2016  7:14AM EST                       (Author)

## 2019-01-11 ENCOUNTER — TELEPHONE (OUTPATIENT)
Dept: GASTROENTEROLOGY | Facility: AMBULARY SURGERY CENTER | Age: 39
End: 2019-01-11

## 2019-01-11 NOTE — TELEPHONE ENCOUNTER
Recall letter that was sent to patient on 12/21/18 for a repeat on colonoscopy with dr Khadijah Izquierdo came back to our office tried calling patient but phone number in chart is a busy signal